# Patient Record
Sex: FEMALE | Race: WHITE | NOT HISPANIC OR LATINO | Employment: OTHER | URBAN - NONMETROPOLITAN AREA
[De-identification: names, ages, dates, MRNs, and addresses within clinical notes are randomized per-mention and may not be internally consistent; named-entity substitution may affect disease eponyms.]

---

## 2017-03-16 DIAGNOSIS — Z12.31 ENCOUNTER FOR SCREENING MAMMOGRAM FOR MALIGNANT NEOPLASM OF BREAST: ICD-10-CM

## 2018-01-18 NOTE — RESULT NOTES
Verified Results  * XR SHOULDER 2+ VIEW RIGHT 28Oct2016 11:25AM Precilla Oppenheim Order Number: [id number]     Test Name Result Flag Reference   XR SHOULDER 2+ VW RIGHT (Report)     RIGHT SHOULDER     INDICATION: Right shoulder pain  M25 511 History/Symptoms - PT FELL DOWN STEPS AT [employer] ON SATURDAY, PAIN AND BRUISING RT SHOULDER     COMPARISON: None     VIEWS: 3; 3 images     FINDINGS:     There is no acute fracture or dislocation  No degenerative changes  No lytic or blastic lesions are seen  Soft tissues are unremarkable  IMPRESSION:     No acute osseous abnormality  Workstation performed: [id number]     Signed by:    Ezra Pierre MD   10/31/16

## 2019-03-14 ENCOUNTER — TELEPHONE (OUTPATIENT)
Dept: FAMILY MEDICINE CLINIC | Facility: CLINIC | Age: 74
End: 2019-03-14

## 2019-03-14 RX ORDER — ACETAMINOPHEN 325 MG/1
TABLET ORAL
COMMUNITY
End: 2019-03-15

## 2019-03-15 ENCOUNTER — OFFICE VISIT (OUTPATIENT)
Dept: FAMILY MEDICINE CLINIC | Facility: CLINIC | Age: 74
End: 2019-03-15
Payer: MEDICARE

## 2019-03-15 VITALS
DIASTOLIC BLOOD PRESSURE: 88 MMHG | BODY MASS INDEX: 26.47 KG/M2 | HEIGHT: 66 IN | SYSTOLIC BLOOD PRESSURE: 158 MMHG | TEMPERATURE: 97.9 F

## 2019-03-15 DIAGNOSIS — B02.9 HERPES ZOSTER WITHOUT COMPLICATION: Primary | ICD-10-CM

## 2019-03-15 PROCEDURE — 99213 OFFICE O/P EST LOW 20 MIN: CPT | Performed by: PHYSICIAN ASSISTANT

## 2019-03-15 RX ORDER — ACYCLOVIR 800 MG/1
800 TABLET ORAL
Qty: 50 TABLET | Refills: 0 | Status: SHIPPED | OUTPATIENT
Start: 2019-03-15 | End: 2019-03-25

## 2019-03-15 NOTE — PROGRESS NOTES
Assessment/Plan:         Diagnoses and all orders for this visit:    Herpes zoster without complication  -     acyclovir (ZOVIRAX) 800 mg tablet; Take 1 tablet (800 mg total) by mouth 5 (five) times a day for 10 days    Other orders  -     Discontinue: acetaminophen (TYLENOL) 325 mg tablet; Take by mouth          Subjective:      Patient ID: Thierno Stephens is a 66 y o  female  Alia Almaguer is here today complaining of painful/itchy rash on R arm  This started  5 days ago  Prior to rash appearing, noticed she felt a "weakness" in her R arm  Rash   This is a new problem  The current episode started in the past 7 days  The problem has been gradually worsening since onset  The affected locations include the right arm  The rash is characterized by blistering, burning, itchiness, pain, redness and swelling  She was exposed to nothing  Pertinent negatives include no congestion, cough, diarrhea, eye pain, fatigue, fever, rhinorrhea, shortness of breath, sore throat or vomiting  The treatment provided no relief  The following portions of the patient's history were reviewed and updated as appropriate:   She  has no past medical history on file  She There are no active problems to display for this patient  She  has a past surgical history that includes Tonsillectomy and adenoidectomy  Her family history includes Breast cancer in her mother  She  reports that she has never smoked  She has never used smokeless tobacco  She reports that she drank alcohol  She reports that she does not use drugs    Current Outpatient Medications   Medication Sig Dispense Refill    acetaminophen (TYLENOL) 325 mg tablet Take 2 tablets by mouth every 6 (six) hours as needed for mild pain or fever 30 tablet 0    Multiple Vitamins-Minerals (MULTIVITAMIN WITH MINERALS) tablet Take 1 tablet by mouth daily      acyclovir (ZOVIRAX) 800 mg tablet Take 1 tablet (800 mg total) by mouth 5 (five) times a day for 10 days 50 tablet 0     No current facility-administered medications for this visit  Current Outpatient Medications on File Prior to Visit   Medication Sig    acetaminophen (TYLENOL) 325 mg tablet Take 2 tablets by mouth every 6 (six) hours as needed for mild pain or fever    Multiple Vitamins-Minerals (MULTIVITAMIN WITH MINERALS) tablet Take 1 tablet by mouth daily    [DISCONTINUED] acetaminophen (TYLENOL) 325 mg tablet Take by mouth    [DISCONTINUED] lidocaine (LIDODERM) 5 % Place 1 patch on the skin every 24 hours for 5 days Remove & Discard patch within 12 hours or as directed by MD    [DISCONTINUED] Naproxen Sodium (ALEVE PO) Take 2 tablets by mouth every 12 (twelve) hours as needed    [DISCONTINUED] neomycin-polymyxin-pramoxine (ANTIBIOTIC CREAM PLUS PAIN RELIEF) 1 % cream Apply 1 application topically 2 (two) times a day     No current facility-administered medications on file prior to visit  She is allergic to caffeine and codeine       Review of Systems   Constitutional: Negative for activity change, appetite change, chills, diaphoresis, fatigue, fever and unexpected weight change  HENT: Negative for congestion, ear pain, postnasal drip, rhinorrhea, sinus pressure, sinus pain, sneezing, sore throat, tinnitus and voice change  Eyes: Negative for pain, redness and visual disturbance  Respiratory: Negative for cough, chest tightness, shortness of breath and wheezing  Cardiovascular: Negative for chest pain, palpitations and leg swelling  Gastrointestinal: Negative for abdominal pain, blood in stool, constipation, diarrhea, nausea and vomiting  Genitourinary: Negative for difficulty urinating, dysuria, frequency, hematuria and urgency  Musculoskeletal: Negative for arthralgias, back pain, gait problem, joint swelling, myalgias, neck pain and neck stiffness  Skin: Positive for rash  Negative for color change, pallor and wound  Neurological: Positive for weakness   Negative for dizziness, tremors, light-headedness and headaches  Psychiatric/Behavioral: Negative for dysphoric mood, self-injury, sleep disturbance and suicidal ideas  The patient is not nervous/anxious  Objective:      /88   Temp 97 9 °F (36 6 °C)   Ht 5' 6" (1 676 m)   BMI 26 47 kg/m²          Physical Exam   Constitutional: She is oriented to person, place, and time  She appears well-developed and well-nourished  No distress  HENT:   Head: Normocephalic and atraumatic  Right Ear: Hearing, tympanic membrane, external ear and ear canal normal    Left Ear: Hearing, tympanic membrane, external ear and ear canal normal    Mouth/Throat: Uvula is midline, oropharynx is clear and moist and mucous membranes are normal  No oropharyngeal exudate  Eyes: Conjunctivae are normal  Right eye exhibits no discharge  Left eye exhibits no discharge  No scleral icterus  Neck: Neck supple  Carotid bruit is not present  No thyromegaly present  Cardiovascular: Normal rate, regular rhythm and normal heart sounds  No murmur heard  Pulmonary/Chest: Effort normal and breath sounds normal  No respiratory distress  She has no wheezes  Abdominal: Soft  Bowel sounds are normal  She exhibits no distension and no mass  There is no tenderness  There is no rebound and no guarding  Musculoskeletal: Normal range of motion  She exhibits no edema or tenderness  Lymphadenopathy:     She has no cervical adenopathy  Neurological: She is alert and oriented to person, place, and time  Skin: Skin is warm and dry  Rash noted  She is not diaphoretic  No erythema  R arm with multiple vesicular patches   Psychiatric: She has a normal mood and affect  Her behavior is normal  Judgment and thought content normal    Vitals reviewed

## 2019-03-23 ENCOUNTER — TELEPHONE (OUTPATIENT)
Dept: OTHER | Facility: OTHER | Age: 74
End: 2019-03-23

## 2019-03-23 ENCOUNTER — DOCUMENTATION (OUTPATIENT)
Dept: FAMILY MEDICINE CLINIC | Facility: CLINIC | Age: 74
End: 2019-03-23

## 2019-03-23 NOTE — TELEPHONE ENCOUNTER
Inga Crawford 1945  CONFIDENTIALTY NOTICE: This fax transmission is intended only for the addressee  It contains information that is legally privileged,  confidential or otherwise protected from use or disclosure  If you are not the intended recipient, you are strictly prohibited from reviewing,  disclosing, copying using or disseminating any of this information or taking any action in reliance on or regarding this information  If you have  received this fax in error, please notify us immediately by telephone so that we can arrange for its return to us  Page:   Call Id: [id number]  Health Call  Standard Call Report  Health Call  Patient Name: Inga Crawford  Gender: Female  : 1945  Age: 66 Y 25 D  Return Phone  Number: [phone] (Home)  Address: [place] [address]  City/State/Zip: [address] [place] [address]  Practice Name: [facility]  Practice Charged:  Physician:  [facility] Name:  Relationship To  Patient: Self  Return Phone Number: [phone] (Home)  Presenting Problem: "I am taking Acyclovir and I think I  may be experiencing side effects "  Service Type: Triage  Charged Service 1: Eva Gray U  38  Name and  Number:  Nurse Assessment  Nurse: Dylan Elmore Date/Time: 3/23/2019 10:45:55 AM  Type of assessment required:  ---General (Adult or Child)  Duration of Current S/S  ---Two days ago  Location/Radiation  ---Back pain  Temperature (F) and route:  ---Denies fever  Symptom Specific Meds (Dose/Time):  ---Tylenol LD: 0800  Other S/S  Looked in [facility] and did not see this as a listed allergy  Patient states the paper  that was given to her with the listed allergies for acyclovir state to call the provider if  patient develops back pain-states this was listed as a serous side effect  ---Patient is having upper back pain in the middle of her back  States this started two  days ago  Is concerned because she thinks it could be related to a side effect with her  acyclovir    Pain Scale on scale of 1-10, 10 being the worst:  ---10/10  Symptom progression:  ---same  Reita Galeazzi 1945  CONFIDENTIALTY NOTICE: This fax transmission is intended only for the addressee  It contains information that is legally privileged,  confidential or otherwise protected from use or disclosure  If you are not the intended recipient, you are strictly prohibited from reviewing,  disclosing, copying using or disseminating any of this information or taking any action in reliance on or regarding this information  If you have  received this fax in error, please notify us immediately by telephone so that we can arrange for its return to us  Page: 2 of 2  Call Id: [id number]  Nurse Assessment  Intake and Output  ---Drinking normally / WNL  Last Exam/Treatment:  ---In the office on 3/15/2019 and diagnosed with Herpes Zoster was prescribed  Acyclovir  Protocols  Protocol Title Nurse Date/Time  No Guideline Available - Sick Adult Call Jaren Chance 3/23/2019 10:59:55 AM  Question Caller Affirmed  Disp  Time Disposition Final User  3/23/2019 11:00:19 AM Call PCP Now Adrienne Bravo RN, Brien Loupe  3/23/2019 11:14:48 AM RN Triaged Yes Adrienne Bravo RN, Annette  Care Advice Given Per Protocol  CALL PCP NOW: You need to discuss this with your doctor  I'll page him now  If you haven't heard from the on-call doctor within  30 minutes, call again  CALL BACK IF: * New symptoms develop * You become worse  CARE ADVICE per nursing judgment or  reference (No Guideline Available - Sick Adult Call; Adult)  Caller Understands: Yes  Caller Disagree/Comply: Comply  PreDisposition: Unsure  Comments  User: Marline Scheuermann, RN Date/Time: 3/23/2019 11:02:36 AM  No protocol  Paged on call provider Dr Keo Saldivar  Awaiting call back  User: Marline Scheuermann, RN Date/Time: 3/23/2019 11:14:44 AM  Discussed patients symptoms with Dr Keo Saldivar stated he would like to speak with the patient  Information provided  for the doctor

## 2019-03-23 NOTE — PROGRESS NOTES
Patient called stating that she thought her acyclovir medication was causing that pain  The pain is on the same side is where she had her shingles  I told her to stop the acyclovir and call us if symptoms continue or increase

## 2020-02-10 ENCOUNTER — TELEPHONE (OUTPATIENT)
Dept: FAMILY MEDICINE CLINIC | Facility: CLINIC | Age: 75
End: 2020-02-10

## 2020-02-21 ENCOUNTER — TELEPHONE (OUTPATIENT)
Dept: FAMILY MEDICINE CLINIC | Facility: CLINIC | Age: 75
End: 2020-02-21

## 2021-03-16 ENCOUNTER — IMMUNIZATIONS (OUTPATIENT)
Dept: FAMILY MEDICINE CLINIC | Facility: HOSPITAL | Age: 76
End: 2021-03-16

## 2021-03-16 DIAGNOSIS — Z23 ENCOUNTER FOR IMMUNIZATION: Primary | ICD-10-CM

## 2021-03-16 PROCEDURE — 91301 SARS-COV-2 / COVID-19 MRNA VACCINE (MODERNA) 100 MCG: CPT

## 2021-03-16 PROCEDURE — 0011A SARS-COV-2 / COVID-19 MRNA VACCINE (MODERNA) 100 MCG: CPT

## 2021-03-29 ENCOUNTER — TELEPHONE (OUTPATIENT)
Dept: FAMILY MEDICINE CLINIC | Facility: CLINIC | Age: 76
End: 2021-03-29

## 2021-04-15 ENCOUNTER — IMMUNIZATIONS (OUTPATIENT)
Dept: FAMILY MEDICINE CLINIC | Facility: HOSPITAL | Age: 76
End: 2021-04-15

## 2021-04-15 DIAGNOSIS — Z23 ENCOUNTER FOR IMMUNIZATION: Primary | ICD-10-CM

## 2021-04-15 PROCEDURE — 0012A SARS-COV-2 / COVID-19 MRNA VACCINE (MODERNA) 100 MCG: CPT

## 2021-04-15 PROCEDURE — 91301 SARS-COV-2 / COVID-19 MRNA VACCINE (MODERNA) 100 MCG: CPT

## 2022-11-05 ENCOUNTER — HOSPITAL ENCOUNTER (EMERGENCY)
Facility: HOSPITAL | Age: 77
Discharge: HOME | End: 2022-11-05
Attending: EMERGENCY MEDICINE | Admitting: EMERGENCY MEDICINE

## 2022-11-05 ENCOUNTER — APPOINTMENT (EMERGENCY)
Dept: CT IMAGING | Facility: HOSPITAL | Age: 77
End: 2022-11-05

## 2022-11-05 VITALS
DIASTOLIC BLOOD PRESSURE: 80 MMHG | TEMPERATURE: 100 F | SYSTOLIC BLOOD PRESSURE: 143 MMHG | HEART RATE: 85 BPM | BODY MASS INDEX: 25.4 KG/M2 | OXYGEN SATURATION: 98 % | WEIGHT: 158.07 LBS | HEIGHT: 66 IN | RESPIRATION RATE: 16 BRPM

## 2022-11-05 DIAGNOSIS — M54.2 NECK PAIN: ICD-10-CM

## 2022-11-05 DIAGNOSIS — S09.90XA INJURY OF HEAD, INITIAL ENCOUNTER: ICD-10-CM
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
## 2022-11-05 DIAGNOSIS — S01.01XA SCALP LACERATION, INITIAL ENCOUNTER: ICD-10-CM

## 2022-11-05 RX ORDER — HYDROCODONE BITARTRATE AND ACETAMINOPHEN 5; 325 MG/1; MG/1
1 TABLET ORAL ONCE
Status: DISCONTINUED | OUTPATIENT
Start: 2022-11-05 | End: 2022-11-05

## 2022-11-05 RX ORDER — GINSENG 100 MG
1 CAPSULE ORAL ONCE
Status: COMPLETED | OUTPATIENT
Start: 2022-11-05 | End: 2022-11-05

## 2022-11-05 RX ORDER — ACETAMINOPHEN 325 MG/1
975 TABLET ORAL ONCE
Status: COMPLETED | OUTPATIENT
Start: 2022-11-05 | End: 2022-11-05

## 2022-11-05 RX ORDER — LIDOCAINE HYDROCHLORIDE AND EPINEPHRINE 10; 10 MG/ML; UG/ML
1 INJECTION, SOLUTION INFILTRATION; PERINEURAL ONCE
Status: COMPLETED | OUTPATIENT
Start: 2022-11-05 | End: 2022-11-05

## 2022-11-05 RX ADMIN — ACETAMINOPHEN 325 MG: 325 TABLET, FILM COATED ORAL at 12:30

## 2022-11-05 RX ADMIN — TETANUS TOXOID, REDUCED DIPHTHERIA TOXOID AND ACELLULAR PERTUSSIS VACCINE, ADSORBED 0.5 ML: 5; 2.5; 8; 8; 2.5 SUSPENSION INTRAMUSCULAR at 12:00

## 2022-11-05 RX ADMIN — LIDOCAINE HYDROCHLORIDE,EPINEPHRINE BITARTRATE 1 ML: 10; .01 INJECTION, SOLUTION INFILTRATION; PERINEURAL at 13:14

## 2022-11-05 RX ADMIN — BACITRACIN 1 LARGE APPLICATION: 500 OINTMENT TOPICAL at 13:36

## 2022-11-05 NOTE — DISCHARGE INSTRUCTIONS
- go to your primary care physician, urgent care, or emergency department in 7 days for staple removal  - keep the wound clean and dry for the next several days  - you may shower, but do not soak the wound in a bathtub or swim for 2 weeks, avoid sun exposure  - return to the emergency department if you develop sudden onset weakness or numbness in any part of your body, confusion, difficulty walking or difficulty talking

## 2022-11-05 NOTE — ED PROVIDER NOTES
History  Chief Complaint   Patient presents with   • Fall     Patient reports falling yesterday afternoon at the gas station and hitting the back of her head; reports falling again during the middle of the night and hitting the front of her head off of a dresser; laceration noted to R side of forehead during triage w/ bleeding controlled; reports no LOC and no thinners     Patient is a 78-year-old female with no significant PMH presents to the emergency department after falling and striking her head on her dresser in her bedroom last night overnight  She reports that yesterday she was at a gas station and tripped and fell backward hitting the back of her head on the ground  She had some bleeding from this, however the bleeding stopped spontaneously and she felt well enough not present for evaluation  She then reports that she awoke at 1:00 a m  this morning to use the restroom and after standing quickly felt a little off balance and fell forward onto her dresser  She reports in applying pressure to the wound throughout the night but that it bled a considerable amount  She was eventually able to stop the bleeding with pressure  Her son came to see her that morning and after seeing the extent of the cut on her forehead brought her directly to the emergency department  She states that she has pain in her neck bilaterally  At this time she denies lightheadedness, blurred vision, confusion, and states that her 1st fall was mechanical in nature and 2nd fall was due to waking quickly in the night and becoming unsteady  She denies dizziness at any point and states that she has otherwise felt well  At this time she denies headache with states that she has pain at the site of her wound  She has a large laceration to her right forehead and onto her scalp into the distribution of the hair  She denies any fevers, nausea, vomiting and denies any recent illnesses    Patient does not take a blood thinner          Prior to Admission Medications   Prescriptions Last Dose Informant Patient Reported? Taking? Multiple Vitamins-Minerals (MULTIVITAMIN WITH MINERALS) tablet   Yes No   Sig: Take 1 tablet by mouth daily   acetaminophen (TYLENOL) 325 mg tablet   No No   Sig: Take 2 tablets by mouth every 6 (six) hours as needed for mild pain or fever   acyclovir (ZOVIRAX) 800 mg tablet   No No   Sig: Take 1 tablet (800 mg total) by mouth 5 (five) times a day for 10 days      Facility-Administered Medications: None       History reviewed  No pertinent past medical history  Past Surgical History:   Procedure Laterality Date   • TONSILLECTOMY AND ADENOIDECTOMY         Family History   Problem Relation Age of Onset   • Breast cancer Mother      I have reviewed and agree with the history as documented  E-Cigarette/Vaping     E-Cigarette/Vaping Substances     Social History     Tobacco Use   • Smoking status: Never Smoker   • Smokeless tobacco: Never Used   Substance Use Topics   • Alcohol use: Not Currently   • Drug use: No        Review of Systems   Constitutional: Negative for chills and fever  HENT: Negative for congestion, ear pain and sore throat  Eyes: Negative for pain and visual disturbance  Respiratory: Negative for cough and shortness of breath  Cardiovascular: Negative for chest pain and palpitations  Gastrointestinal: Negative for abdominal pain, constipation, diarrhea, nausea and vomiting  Genitourinary: Negative for dysuria and hematuria  Musculoskeletal: Positive for neck pain  Negative for arthralgias and back pain  Skin: Positive for wound  Negative for color change and rash  Pain at wound site   Neurological: Negative for dizziness, seizures, syncope, facial asymmetry, light-headedness, numbness and headaches  All other systems reviewed and are negative        Physical Exam  ED Triage Vitals [11/05/22 1055]   Temperature Pulse Respirations Blood Pressure SpO2   100 °F (37 8 °C) 92 16 158/77 97 % Temp Source Heart Rate Source Patient Position - Orthostatic VS BP Location FiO2 (%)   Temporal Monitor -- -- --      Pain Score       10 - Worst Possible Pain             Orthostatic Vital Signs  Vitals:    11/05/22 1100 11/05/22 1130 11/05/22 1200 11/05/22 1328   BP: 145/76 144/76 168/77 143/80   Pulse: 94 83 87 85       Physical Exam  Vitals and nursing note reviewed  Constitutional:       General: She is not in acute distress  Appearance: Normal appearance  She is well-developed  She is not ill-appearing or toxic-appearing  HENT:      Head: Normocephalic  Laceration present  No raccoon eyes or Arguello's sign  Right Ear: External ear normal       Left Ear: External ear normal       Mouth/Throat:      Pharynx: Oropharynx is clear  No oropharyngeal exudate or posterior oropharyngeal erythema  Eyes:      General:         Right eye: No discharge  Left eye: No discharge  Extraocular Movements: Extraocular movements intact  Conjunctiva/sclera: Conjunctivae normal       Pupils: Pupils are equal, round, and reactive to light  Cardiovascular:      Rate and Rhythm: Normal rate and regular rhythm  Pulses: Normal pulses  Heart sounds: Normal heart sounds  No murmur heard  Pulmonary:      Effort: Pulmonary effort is normal  No respiratory distress  Breath sounds: Normal breath sounds  No wheezing, rhonchi or rales  Abdominal:      General: Abdomen is flat  Palpations: Abdomen is soft  Tenderness: There is no abdominal tenderness  There is no guarding or rebound  Musculoskeletal:         General: No swelling or deformity  Normal range of motion  Cervical back: Normal range of motion and neck supple  Tenderness (Musculature bilaterally, no spinal tenderness) present  Right lower leg: No edema  Left lower leg: No edema  Skin:     General: Skin is warm and dry  Capillary Refill: Capillary refill takes less than 2 seconds     Neurological: Mental Status: She is alert and oriented to person, place, and time  Cranial Nerves: No cranial nerve deficit  Motor: No weakness  Coordination: Coordination normal       Gait: Gait normal          ED Medications  Medications   lidocaine-epinephrine (XYLOCAINE/EPINEPHRINE) 1 %-1:100,000 injection 1 mL (1 mL Infiltration Given by Other 11/5/22 1314)   bacitracin topical ointment 1 large application (1 large application Topical Given 11/5/22 1336)   tetanus-diphtheria-acellular pertussis (BOOSTRIX) IM injection 0 5 mL (0 5 mL Intramuscular Given 11/5/22 1200)   acetaminophen (TYLENOL) tablet 975 mg (325 mg Oral Given 11/5/22 1230)       Diagnostic Studies  Results Reviewed     None                 CT head without contrast   Final Result by Spencer Bailey MD (11/05 1258)      1  No acute intracranial CT abnormality  Right frontal scalp laceration      2  Nonspecific white matter change suggesting microangiopathy  Workstation performed: [id number]         CT cervical spine without contrast   Final Result by Spencer Bailey MD (11/05 4675)      No acute cervical spine fracture or traumatic malalignment  Workstation performed: [id number]               Procedures  Laceration repair    Date/Time: 11/5/2022 1:23 PM  Performed by: Lizette Chavez DO  Authorized by: Lizette Chavez DO   Consent: Verbal consent obtained    Risks and benefits: risks, benefits and alternatives were discussed  Consent given by: patient  Patient understanding: patient states understanding of the procedure being performed  Patient consent: the patient's understanding of the procedure matches consent given  Patient identity confirmed: verbally with patient  Body area: head/neck  Location details: scalp  Laceration length: 15 cm  Foreign bodies: no foreign bodies  Tendon involvement: none  Nerve involvement: none  Vascular damage: no  Anesthesia: local infiltration    Anesthesia:  Local Anesthetic: lidocaine 1% with epinephrine  Anesthetic total: 4 mL    Wound Dehiscence:  Superficial Wound Dehiscence: simple closure      Procedure Details:  Irrigation solution: Sterile water  Irrigation method: jet lavage  Amount of cleaning: standard  Debridement: none  Degree of undermining: none  Skin closure: staples  Wound subcutaneous closure material used: 6-0 Vicryl  Number of sutures: 11 sutures, 12 staples  Technique: simple  Approximation: close  Approximation difficulty: simple  Dressing: antibiotic ointment  Comments: Eleven sutures placed which are absorbable  Twelve staples placed which will require removal in 7 days            ED Course                                       MDM  Number of Diagnoses or Management Options  Fall, initial encounter  Injury of head, initial encounter  Neck pain  Scalp laceration, initial encounter  Diagnosis management comments: 77F with no significant PMH that presents to the emergency department with large laceration to the right forehead and scalp after fall that occurred roughly 10 hours prior to arrival   The patient denies blurred vision, headache, nausea, vomiting, or other concerning symptoms for severe TBI  The patient has 15 cm, curved laceration extending from the right forehead up to the apex of the scalp  The wound is gaping roughly 1cm and is hemostatic at this time  There is no evidence of laceration to the posterior scalp  Patient's wound is cleaned and repaired well using both absorbable sutures and staples with good approximation  Antibacterial ointment is placed on the patient's wound after closure  CT scan of the patient's head and neck revealed no acute bony abnormalities or intracranial abnormalities  In the absence of concerning findings on physical exam, in the patient's history, and imaging evaluation the patient is appropriate for discharge home with follow-up with her primary care provider    She is given information regarding care for her sutures and staples  Long discussion is also had regarding when to return to the emergency department  Return precautions are discussed with the patient and they demonstrate understanding of the plan  The patient's questions are all answered to the their satisfaction and the patient is discharged home  Disposition  Final diagnoses:   Fall, initial encounter   Injury of head, initial encounter   Neck pain   Scalp laceration, initial encounter     Time reflects when diagnosis was documented in both MDM as applicable and the Disposition within this note     Time User Action Codes Description Comment    11/5/2022  1:36 PM Vinay Rogers Add [K61  PHTY] Fall, initial encounter     11/5/2022  1:36 PM Juliane Iman [B54 66XV] Injury of head, initial encounter     11/5/2022  1:36 PM Juliane Iman [M54 2] Neck pain     11/5/2022  1:36 PM Alexeikirsten Rogers Add [S01 01XA] Scalp laceration, initial encounter       ED Disposition     ED Disposition   Discharge    Condition   Stable    Date/Time   Sat Nov 5, 2022  1:36 PM    Comment   An Hopes discharge to home/self care  Follow-up Information     Follow up With Specialties Details Why Contact Info Additional [phone] [facility] Call  To schedule an appointment for further evaluation and treatment of falls [address] [phone]  [phone] [facility], [address], [place], [address] [facility] Emergency Department Emergency Medicine Go to  return to the emergency department if you develop sudden onset weakness or numbness in any part of your body, confusion, difficulty walking or difficulty talking   [address] [phone]  [address] [place] [address] Emergency Department, [address], [address]          Discharge Medication List as of 11/5/2022  1:40 PM CONTINUE these medications which have NOT CHANGED    Details   acetaminophen (TYLENOL) 325 mg tablet Take 2 tablets by mouth every 6 (six) hours as needed for mild pain or fever, Starting 10/22/2016, Until Discontinued, Print      acyclovir (ZOVIRAX) 800 mg tablet Take 1 tablet (800 mg total) by mouth 5 (five) times a day for 10 days, Starting Fri 3/15/2019, Until Mon 3/25/2019, Normal      Multiple Vitamins-Minerals (MULTIVITAMIN WITH MINERALS) tablet Take 1 tablet by mouth daily, Until Discontinued, Historical Med           No discharge procedures on file  PDMP Review     None           ED Provider  Attending physically available and evaluated Swathi Shah I managed the patient along with the ED Attending      Electronically Signed by         Ronda Veras DO  11/05/22 9501

## 2022-11-05 NOTE — ED ATTENDING ATTESTATION
11/5/2022  IVane DO, saw and evaluated the patient  I have discussed the patient with the resident/non-physician practitioner and agree with the resident's/non-physician practitioner's findings, Plan of Care, and MDM as documented in the resident's/non-physician practitioner's note, except where noted  All available labs and Radiology studies were reviewed  I was present for key portions of any procedure(s) performed by the resident/non-physician practitioner and I was immediately available to provide assistance  At this point I agree with the current assessment done in the Emergency Department  I have conducted an independent evaluation of this patient a history and physical is as follows:    ED Course       59-year-old female presenting after she experienced 2 falls yesterday  Patient states she was at a gas station in the afternoon when she slipped on a wet surface and fell backwards striking the back of her head  No seizure activity  Patient is not on any anticoagulation or daily aspirin  Patient went to bed at night and early this morning when she went to get up she states she did not completely have her feet on the ground and fell forward striking the right upper portion over forehead scalp on the dresser sustaining a large laceration  Also complains of neck pain  CT head neck were negative for any acute traumatic injuries  Wounds were cleansed and repaired, see procedure note  Patient's tetanus was updated today        Critical Care Time  Procedures